# Patient Record
Sex: MALE | Race: WHITE | NOT HISPANIC OR LATINO | URBAN - METROPOLITAN AREA
[De-identification: names, ages, dates, MRNs, and addresses within clinical notes are randomized per-mention and may not be internally consistent; named-entity substitution may affect disease eponyms.]

---

## 2022-11-24 ENCOUNTER — EMERGENCY (EMERGENCY)
Facility: HOSPITAL | Age: 37
LOS: 1 days | Discharge: HOME | End: 2022-11-24
Attending: EMERGENCY MEDICINE | Admitting: EMERGENCY MEDICINE
Payer: SELF-PAY

## 2022-11-24 VITALS
WEIGHT: 220.02 LBS | OXYGEN SATURATION: 94 % | DIASTOLIC BLOOD PRESSURE: 64 MMHG | TEMPERATURE: 98 F | HEIGHT: 68 IN | SYSTOLIC BLOOD PRESSURE: 100 MMHG | HEART RATE: 80 BPM | RESPIRATION RATE: 15 BRPM

## 2022-11-24 DIAGNOSIS — Z98.890 OTHER SPECIFIED POSTPROCEDURAL STATES: Chronic | ICD-10-CM

## 2022-11-24 LAB
ALBUMIN SERPL ELPH-MCNC: 3.9 G/DL — SIGNIFICANT CHANGE UP (ref 3.3–5)
ALP SERPL-CCNC: 63 U/L — SIGNIFICANT CHANGE UP (ref 30–120)
ALT FLD-CCNC: 24 U/L DA — SIGNIFICANT CHANGE UP (ref 10–60)
ANION GAP SERPL CALC-SCNC: 10 MMOL/L — SIGNIFICANT CHANGE UP (ref 5–17)
APTT BLD: 24.2 SEC — LOW (ref 27.5–35.5)
AST SERPL-CCNC: 24 U/L — SIGNIFICANT CHANGE UP (ref 10–40)
BASOPHILS # BLD AUTO: 0.04 K/UL — SIGNIFICANT CHANGE UP (ref 0–0.2)
BASOPHILS NFR BLD AUTO: 0.6 % — SIGNIFICANT CHANGE UP (ref 0–2)
BILIRUB SERPL-MCNC: 0.2 MG/DL — SIGNIFICANT CHANGE UP (ref 0.2–1.2)
BUN SERPL-MCNC: 18 MG/DL — SIGNIFICANT CHANGE UP (ref 7–23)
CALCIUM SERPL-MCNC: 8.3 MG/DL — LOW (ref 8.4–10.5)
CHLORIDE SERPL-SCNC: 106 MMOL/L — SIGNIFICANT CHANGE UP (ref 96–108)
CO2 SERPL-SCNC: 27 MMOL/L — SIGNIFICANT CHANGE UP (ref 22–31)
CREAT SERPL-MCNC: 0.98 MG/DL — SIGNIFICANT CHANGE UP (ref 0.5–1.3)
EGFR: 101 ML/MIN/1.73M2 — SIGNIFICANT CHANGE UP
EOSINOPHIL # BLD AUTO: 0.26 K/UL — SIGNIFICANT CHANGE UP (ref 0–0.5)
EOSINOPHIL NFR BLD AUTO: 3.8 % — SIGNIFICANT CHANGE UP (ref 0–6)
ETHANOL SERPL-MCNC: <3 MG/DL — SIGNIFICANT CHANGE UP (ref 0–3)
FLUAV AG NPH QL: SIGNIFICANT CHANGE UP
FLUBV AG NPH QL: SIGNIFICANT CHANGE UP
GLUCOSE SERPL-MCNC: 160 MG/DL — HIGH (ref 70–99)
HCT VFR BLD CALC: 41 % — SIGNIFICANT CHANGE UP (ref 39–50)
HGB BLD-MCNC: 14.3 G/DL — SIGNIFICANT CHANGE UP (ref 13–17)
IMM GRANULOCYTES NFR BLD AUTO: 0.4 % — SIGNIFICANT CHANGE UP (ref 0–0.9)
INR BLD: 1.03 RATIO — SIGNIFICANT CHANGE UP (ref 0.88–1.16)
LYMPHOCYTES # BLD AUTO: 2.4 K/UL — SIGNIFICANT CHANGE UP (ref 1–3.3)
LYMPHOCYTES # BLD AUTO: 35.1 % — SIGNIFICANT CHANGE UP (ref 13–44)
MCHC RBC-ENTMCNC: 33.6 PG — SIGNIFICANT CHANGE UP (ref 27–34)
MCHC RBC-ENTMCNC: 34.9 GM/DL — SIGNIFICANT CHANGE UP (ref 32–36)
MCV RBC AUTO: 96.5 FL — SIGNIFICANT CHANGE UP (ref 80–100)
MONOCYTES # BLD AUTO: 0.43 K/UL — SIGNIFICANT CHANGE UP (ref 0–0.9)
MONOCYTES NFR BLD AUTO: 6.3 % — SIGNIFICANT CHANGE UP (ref 2–14)
NEUTROPHILS # BLD AUTO: 3.68 K/UL — SIGNIFICANT CHANGE UP (ref 1.8–7.4)
NEUTROPHILS NFR BLD AUTO: 53.8 % — SIGNIFICANT CHANGE UP (ref 43–77)
NRBC # BLD: 0 /100 WBCS — SIGNIFICANT CHANGE UP (ref 0–0)
PLATELET # BLD AUTO: 199 K/UL — SIGNIFICANT CHANGE UP (ref 150–400)
POTASSIUM SERPL-MCNC: 3.7 MMOL/L — SIGNIFICANT CHANGE UP (ref 3.5–5.3)
POTASSIUM SERPL-SCNC: 3.7 MMOL/L — SIGNIFICANT CHANGE UP (ref 3.5–5.3)
PROT SERPL-MCNC: 6.5 G/DL — SIGNIFICANT CHANGE UP (ref 6–8.3)
PROTHROM AB SERPL-ACNC: 12.1 SEC — SIGNIFICANT CHANGE UP (ref 10.5–13.4)
RBC # BLD: 4.25 M/UL — SIGNIFICANT CHANGE UP (ref 4.2–5.8)
RBC # FLD: 11.9 % — SIGNIFICANT CHANGE UP (ref 10.3–14.5)
RSV RNA NPH QL NAA+NON-PROBE: SIGNIFICANT CHANGE UP
SARS-COV-2 RNA SPEC QL NAA+PROBE: SIGNIFICANT CHANGE UP
SODIUM SERPL-SCNC: 143 MMOL/L — SIGNIFICANT CHANGE UP (ref 135–145)
TROPONIN I, HIGH SENSITIVITY RESULT: 4.3 NG/L — SIGNIFICANT CHANGE UP
WBC # BLD: 6.84 K/UL — SIGNIFICANT CHANGE UP (ref 3.8–10.5)
WBC # FLD AUTO: 6.84 K/UL — SIGNIFICANT CHANGE UP (ref 3.8–10.5)

## 2022-11-24 PROCEDURE — 85025 COMPLETE CBC W/AUTO DIFF WBC: CPT

## 2022-11-24 PROCEDURE — 70450 CT HEAD/BRAIN W/O DYE: CPT | Mod: MA

## 2022-11-24 PROCEDURE — 36000 PLACE NEEDLE IN VEIN: CPT

## 2022-11-24 PROCEDURE — 87637 SARSCOV2&INF A&B&RSV AMP PRB: CPT

## 2022-11-24 PROCEDURE — 71045 X-RAY EXAM CHEST 1 VIEW: CPT

## 2022-11-24 PROCEDURE — 70450 CT HEAD/BRAIN W/O DYE: CPT | Mod: 26,MA

## 2022-11-24 PROCEDURE — 85610 PROTHROMBIN TIME: CPT

## 2022-11-24 PROCEDURE — 93005 ELECTROCARDIOGRAM TRACING: CPT

## 2022-11-24 PROCEDURE — 71045 X-RAY EXAM CHEST 1 VIEW: CPT | Mod: 26

## 2022-11-24 PROCEDURE — 85730 THROMBOPLASTIN TIME PARTIAL: CPT

## 2022-11-24 PROCEDURE — 80307 DRUG TEST PRSMV CHEM ANLYZR: CPT

## 2022-11-24 PROCEDURE — 93010 ELECTROCARDIOGRAM REPORT: CPT

## 2022-11-24 PROCEDURE — 80053 COMPREHEN METABOLIC PANEL: CPT

## 2022-11-24 PROCEDURE — 84484 ASSAY OF TROPONIN QUANT: CPT

## 2022-11-24 PROCEDURE — 36415 COLL VENOUS BLD VENIPUNCTURE: CPT

## 2022-11-24 PROCEDURE — 99285 EMERGENCY DEPT VISIT HI MDM: CPT

## 2022-11-24 PROCEDURE — 99284 EMERGENCY DEPT VISIT MOD MDM: CPT | Mod: 25

## 2022-11-24 RX ORDER — SODIUM CHLORIDE 9 MG/ML
1000 INJECTION INTRAMUSCULAR; INTRAVENOUS; SUBCUTANEOUS ONCE
Refills: 0 | Status: COMPLETED | OUTPATIENT
Start: 2022-11-24 | End: 2022-11-24

## 2022-11-24 RX ADMIN — SODIUM CHLORIDE 1000 MILLILITER(S): 9 INJECTION INTRAMUSCULAR; INTRAVENOUS; SUBCUTANEOUS at 18:12

## 2022-11-24 RX ADMIN — SODIUM CHLORIDE 1000 MILLILITER(S): 9 INJECTION INTRAMUSCULAR; INTRAVENOUS; SUBCUTANEOUS at 19:19

## 2022-11-24 NOTE — ED ADULT NURSE REASSESSMENT NOTE - NS ED NURSE REASSESS COMMENT FT1
Report received from Maria LINO.  Pt is resting comfortably, has no complaints at this time.  Pt placed back on CM after imaging.  Orthostatic pressures performed as per ordered.  Pt and spouse updated on plan of care.  Safety maintained.

## 2022-11-24 NOTE — ED PROVIDER NOTE - CARE PROVIDER_API CALL
Juan Gregory (MD)  Cardiovascular Disease; Internal Medicine  [address] [facility] [address]  [place] [address]  Phone: [phone]  Fax: [phone]  Follow Up Time: 7-10 Days

## 2022-11-24 NOTE — ED PROVIDER NOTE - CARDIOVASCULAR [-], MLM
Problem: Patient Care Overview  Goal: Plan of Care/Patient Progress Review  Outcome: No Change  [id number] note: Infants vital signs were stable. Remains on LINDSAY NCPAP with FiO2 needs of 31-42%. Infant is tolerating gavage feedings. Abdomen remains distended but soft with active bowel sounds. Infant is voiding well and had a stool. Continue to monitor all parameters and contact provider with any changes.        no chest pain/no palpitations/no peripheral edema

## 2022-11-24 NOTE — ED PROVIDER NOTE - CLINICAL SUMMARY MEDICAL DECISION MAKING FREE TEXT BOX
pt reports syncopal episode while sitting in chair  remembers getting flushed and lightheaded and the next thing he remembers was people around him  no prior episodes  pt reports that he had 2 whiskys approx 2 hours ago  he also reports that he smoked THC around 1:30-2 pm reports that he usually smokes  does not normally drink  reports that he had just eaten dinner  no HA, visual changes N/V (vomited right after the event)  NO CP, SOB, abd pain, denies ext numbness or weakness Patient is a 37-year-old male who presents the emergency room with a chief complaint of syncopal episode.  Patient with no significant past medical history.  Reports that he was sitting in a chair.  He states that he remembers suddenly getting flushed and hot and felt lightheaded and the next thing he remembers people around him.  Per family at the bedside patient had a witnessed syncopal episode where his eyes rolled into the back of his head and his upper extremities appeared stiff for movement.  No generalized shaking.  No loss of bowel or bladder control and no tongue bite.  Initially upon regaining consciousness patient had an episode of nausea and vomiting but this is resolved.  Denies prior syncopal episodes.  He does report that he had 2 whiskey drinks approximately 2 hours prior to the episode and that he does not normally drink alcohol.  He also endorses that he smokes marijuana around 1:32 PM that he does usually smoke although rarely mixes with alcohol.  He had just eaten dinner.  Right now he denies any headache visual changes chest pain shortness of breath abdominal pain extremity numbness or weakness.  Family confirms patient is at baseline.  On exam patient is lying in bed no acute distress normocephalic atraumatic pupils are equal round and reactive hearts regular rate lungs are clear to auscultation abdomen soft nontender nondistended cranial nerves II through XII are intact with NIH stroke scale of 0.  Patient is presenting the emergency room with a chief complaint of syncopal episode.  Had prodrome prior to the episode.  Likely a result of mixing alcohol and marijuana.  Patient is mildly hypotensive upon arrival IV fluids running.  Will obtain screening labs check cardiac markers EKG hydrate obtain orthostatics obtain CT head imaging.  Will monitor patient can likely be discharged home but final disposition will be pending results.

## 2022-11-24 NOTE — ED ADULT NURSE NOTE - CHIEF COMPLAINT QUOTE
patient is brought in by ems from restaurant, LOC for 30 seconds, felt heat when passed out, denies any pain,

## 2022-11-24 NOTE — ED PROVIDER NOTE - NSICDXPASTSURGICALHX_GEN_ALL_CORE_FT
PAST SURGICAL HISTORY:  No significant past surgical history PAST SURGICAL HISTORY:  S/P hernia surgery

## 2022-11-24 NOTE — ED ADULT NURSE REASSESSMENT NOTE - NSIMPLEMENTINTERV_GEN_ALL_ED
Implemented All Universal Safety Interventions:  [place] to call system. Call bell, personal items and telephone within reach. Instruct patient to call for assistance. Room bathroom lighting operational. Non-slip footwear when patient is off stretcher. Physically safe environment: no spills, clutter or unnecessary equipment. Stretcher in lowest position, wheels locked, appropriate side rails in place.

## 2022-11-24 NOTE — ED PROVIDER NOTE - PATIENT PORTAL LINK FT
You can access the FollowMyHealth Patient Portal offered by [facility] by registering at the following website: [email or web address]. By joining [employer]s FollowMyHealth portal, you will also be able to view your health information using other applications (apps) compatible with our system.

## 2022-11-24 NOTE — ED ADULT NURSE NOTE - OBJECTIVE STATEMENT
37 YOM A&OX3 brought in by EMS by wheelchair for syncopal episode. as per EMS, pt was at dinner at a restaurant when he suddenly felt warm and passed out for 30 seconds. pt denies hitting head, pt also denies history of syncope. pt placed on cardiac monitor and ekg completed in ED. pt denies chest pain, sob, n/v/d, headaches, blurry vision. wife at bedside, safety maintained.

## 2022-11-24 NOTE — ED PROVIDER NOTE - NS ED ATTENDING STATEMENT MOD
This was a shared visit with the MARGUERITE. I reviewed and verified the documentation and independently performed the documented:

## 2022-11-24 NOTE — ED PROVIDER NOTE - OBJECTIVE STATEMENT
pt is a 38yo male with no significant pmhx current smoker presents s/p syncopal event. pt wife reports pt was sitting at the table and had episode of loc for ~30 seconds. pt reports he felt hot prior to episode but is without complaints at this time. pt smoked marijuana and drank 2 hard liquor drinks prior to episode possibly attributing to symptoms. pt denies fever, cp, sob, n/v/d, headache, abd pain, dysuria.

## 2022-11-24 NOTE — ED PROVIDER NOTE - PROGRESS NOTE DETAILS
advised cardio follow up. refrain from etoh and thc use. All imaging and labs reviewed. all results reviewed with pt including abnormal results. pt given a copy of results. pt advised to follow up with pmd regarding abnormal results. All questions answered and concerns addressed. pt verbalized understanding and agreement with plan and dx. pt advised on next step and when/where to follow up. pt advised on all take home and otc medications. pt advised to follow up with PMD. pt advised to return to ed for worsenng symptoms including fever, cp, sob. will dc.

## 2022-11-24 NOTE — ED PROVIDER NOTE - NSFOLLOWUPINSTRUCTIONS_ED_ALL_ED_FT
1. FOLLOW UP WITH YOUR PRIMARY DOCTOR IN 24-48 HOURS.   2. FOLLOW UP WITH ALL SPECIALIST DISCUSSED DURING YOUR VISIT.   3. TAKE ALL MEDICATIONS PRESCRIBED IN THE ER IF ANY ARE PRESCRIBED. CONTINUE YOUR HOME MEDICATIONS UNLESS OTHERWISE ADVISED DIFFERENTLY.   4. RETURN FOR WORSENING SYMPTOMS OR CONCERNS INCLUDING BUT NOT LIMITED TO FEVER, CHEST PAIN, OR TROUBLE BREATHING OR ANY OTHER CONCERNS  do not drink etoh or use marijuana. stay hydrated.                                                                         Syncope, Adult    Outline of the head showing blood vessels that supply the brain.   Syncope is when you pass out or faint for a short time. It is caused by a sudden decrease in blood flow to the brain. This can happen for many reasons. It can sometimes happen when seeing blood, getting a shot (injection), or having pain or strong emotions. Most causes of fainting are not dangerous, but in some cases it can be a sign of a serious medical problem.    If you faint, get help right away. Call your local emergency services (911 in the [place]      Follow these instructions at home:    Watch for any changes in your symptoms. Take these actions to stay safe and help with your symptoms:    Knowing when you may be about to faint   •Signs that you may be about to faint include:  •Feeling dizzy or light-headed. It may feel like the room is spinning.      •Feeling weak.      •Feeling like you may vomit (nauseous).      •Seeing spots or seeing all white or all black.      •Having cold, clammy skin.      •Feeling warm and sweaty.      •Hearing ringing in the ears.      •If you start to feel like you might faint, sit or lie down right away. If sitting, lower your head down between your legs. If lying down, raise (elevate) your feet above the level of your heart.  •Breathe deeply and steadily. Wait until all of the symptoms are gone.      •Have someone stay with you until you feel better.        Medicines     •Take over-the-counter and prescription medicines only as told by your doctor.      •If you are taking blood pressure or heart medicine, sit up and stand up slowly. Spend a few minutes getting ready to sit and then stand. This can help you feel less dizzy.      Lifestyle     • Do not drive, use machinery, or play sports until your doctor says it is okay.      • Do not drink alcohol.      • Do not smoke or use any products that contain nicotine or tobacco. If you need help quitting, ask your doctor.      •Avoid hot tubs and saunas.      General instructions     •Talk with your doctor about your symptoms. You may need to have testing to help find the cause.      •Drink enough fluid to keep your pee (urine) pale yellow.    •Avoid standing for a long time. If you must stand for a long time, do movements such as:  •Moving your legs.       •Crossing your legs.       •Flexing and stretching your leg muscles.       •Squatting.        •Keep all follow-up visits.        Contact a doctor if:    •You have episodes of near fainting.        Get help right away if:    •You pass out or faint.      •You hit your head or are injured after fainting.    •You have any of these symptoms:  •Fast or uneven heartbeats (palpitations).      •Pain in your chest, belly, or back.      •Shortness of breath.        •You have jerky movements that you cannot control (seizure).      •You have a very bad headache.      •You are confused.      •You have problems with how you see (vision).      •You are very weak.      •You have trouble walking.      •You are bleeding from your mouth or your butt (rectum).      •You have black or tarry poop (stool).      These symptoms may be an emergency. Get help right away. Call your local emergency services (911 in the [place]    • Do not wait to see if the symptoms will go away.       • Do not drive yourself to the hospital.         Summary    •Syncope is when you pass out or faint for a short time. It is caused by a sudden decrease in blood flow to the brain.      •Signs that you may be about to faint include feeling dizzy or light-headed, feeling like you may vomit, seeing all white or all black, or having cold, clammy skin.      •If you start to feel like you might faint, sit or lie down right away. Lower your head if sitting, or raise (elevate) your feet if lying down. Breathe deeply and steadily. Wait until all of the symptoms are gone.      This information is not intended to replace advice given to you by your health care provider. Make sure you discuss any questions you have with your health care provider.      Document Revised: 04/28/2022 Document Reviewed: 04/28/2022    Elsevier Patient Education © 2022 [employer]

## 2024-11-15 NOTE — ED ADULT TRIAGE NOTE - AS HEIGHT TYPE
Received fax from pharmacy:    fluticasone propionate (FLOVENT HFA) 44 MCG/ACT inhaler     Is NOT covered by insurance.    Recommending:    ASMANEX  ASMANEXHFA  QVARREDIHALER  NATALIEPTA           stated